# Patient Record
Sex: FEMALE | Race: WHITE
[De-identification: names, ages, dates, MRNs, and addresses within clinical notes are randomized per-mention and may not be internally consistent; named-entity substitution may affect disease eponyms.]

---

## 2018-07-19 ENCOUNTER — HOSPITAL ENCOUNTER (OUTPATIENT)
Dept: HOSPITAL 62 - ER | Age: 58
Setting detail: OBSERVATION
Discharge: ANOTHER HOSPITAL | End: 2018-07-19
Attending: INTERNAL MEDICINE | Admitting: INTERNAL MEDICINE
Payer: MEDICARE

## 2018-07-19 VITALS — DIASTOLIC BLOOD PRESSURE: 77 MMHG | SYSTOLIC BLOOD PRESSURE: 130 MMHG

## 2018-07-19 DIAGNOSIS — Z82.49: ICD-10-CM

## 2018-07-19 DIAGNOSIS — J44.9: ICD-10-CM

## 2018-07-19 DIAGNOSIS — G89.29: ICD-10-CM

## 2018-07-19 DIAGNOSIS — I10: ICD-10-CM

## 2018-07-19 DIAGNOSIS — I21.4: Primary | ICD-10-CM

## 2018-07-19 DIAGNOSIS — F17.200: ICD-10-CM

## 2018-07-19 DIAGNOSIS — Z98.1: ICD-10-CM

## 2018-07-19 DIAGNOSIS — Z79.899: ICD-10-CM

## 2018-07-19 DIAGNOSIS — M54.2: ICD-10-CM

## 2018-07-19 DIAGNOSIS — E03.9: ICD-10-CM

## 2018-07-19 LAB
ABSOLUTE LYMPHOCYTES# (MANUAL): 4.2 10^3/UL (ref 0.5–4.7)
ABSOLUTE MONOCYTES # (MANUAL): 0.6 10^3/UL (ref 0.1–1.4)
ABSOLUTE NEUTROPHILS# (MANUAL): 3.7 10^3/UL (ref 1.7–8.2)
ADD MANUAL DIFF: YES
ALBUMIN SERPL-MCNC: 4.4 G/DL (ref 3.5–5)
ALP SERPL-CCNC: 70 U/L (ref 38–126)
ALT SERPL-CCNC: 27 U/L (ref 9–52)
ANION GAP SERPL CALC-SCNC: 11 MMOL/L (ref 5–19)
AST SERPL-CCNC: 49 U/L (ref 14–36)
BASOPHILS NFR BLD MANUAL: 0 % (ref 0–2)
BILIRUB DIRECT SERPL-MCNC: 0.4 MG/DL (ref 0–0.4)
BILIRUB SERPL-MCNC: 0.4 MG/DL (ref 0.2–1.3)
BUN SERPL-MCNC: 12 MG/DL (ref 7–20)
CALCIUM: 9.9 MG/DL (ref 8.4–10.2)
CHLORIDE SERPL-SCNC: 110 MMOL/L (ref 98–107)
CK MB SERPL-MCNC: 2.98 NG/ML (ref ?–4.55)
CK SERPL-CCNC: 85 U/L (ref 30–135)
CO2 SERPL-SCNC: 26 MMOL/L (ref 22–30)
EOSINOPHIL NFR BLD MANUAL: 11 % (ref 0–6)
ERYTHROCYTE [DISTWIDTH] IN BLOOD BY AUTOMATED COUNT: 12.8 % (ref 11.5–14)
GLUCOSE SERPL-MCNC: 94 MG/DL (ref 75–110)
HCT VFR BLD CALC: 47.5 % (ref 36–47)
HGB BLD-MCNC: 16.4 G/DL (ref 12–15.5)
MCH RBC QN AUTO: 32.7 PG (ref 27–33.4)
MCHC RBC AUTO-ENTMCNC: 34.4 G/DL (ref 32–36)
MCV RBC AUTO: 95 FL (ref 80–97)
MONOCYTES % (MANUAL): 6 % (ref 3–13)
PLATELET # BLD: 174 10^3/UL (ref 150–450)
PLATELET COMMENT: ADEQUATE
POTASSIUM SERPL-SCNC: 4.5 MMOL/L (ref 3.6–5)
PROT SERPL-MCNC: 8 G/DL (ref 6.3–8.2)
RBC # BLD AUTO: 5 10^6/UL (ref 3.72–5.28)
RBC MORPH BLD: (no result)
SEGMENTED NEUTROPHILS % (MAN): 39 % (ref 42–78)
SODIUM SERPL-SCNC: 146.7 MMOL/L (ref 137–145)
TOTAL CELLS COUNTED BLD: 100
TROPONIN I SERPL-MCNC: 1.14 NG/ML
VARIANT LYMPHS NFR BLD MANUAL: 44 % (ref 13–45)
WBC # BLD AUTO: 9.5 10^3/UL (ref 4–10.5)

## 2018-07-19 PROCEDURE — 82553 CREATINE MB FRACTION: CPT

## 2018-07-19 PROCEDURE — 96375 TX/PRO/DX INJ NEW DRUG ADDON: CPT

## 2018-07-19 PROCEDURE — 99285 EMERGENCY DEPT VISIT HI MDM: CPT

## 2018-07-19 PROCEDURE — 36415 COLL VENOUS BLD VENIPUNCTURE: CPT

## 2018-07-19 PROCEDURE — 80053 COMPREHEN METABOLIC PANEL: CPT

## 2018-07-19 PROCEDURE — 96374 THER/PROPH/DIAG INJ IV PUSH: CPT

## 2018-07-19 PROCEDURE — 93010 ELECTROCARDIOGRAM REPORT: CPT

## 2018-07-19 PROCEDURE — 93005 ELECTROCARDIOGRAM TRACING: CPT

## 2018-07-19 PROCEDURE — 93306 TTE W/DOPPLER COMPLETE: CPT

## 2018-07-19 PROCEDURE — G0378 HOSPITAL OBSERVATION PER HR: HCPCS

## 2018-07-19 PROCEDURE — 82550 ASSAY OF CK (CPK): CPT

## 2018-07-19 PROCEDURE — 85025 COMPLETE CBC W/AUTO DIFF WBC: CPT

## 2018-07-19 PROCEDURE — 71045 X-RAY EXAM CHEST 1 VIEW: CPT

## 2018-07-19 PROCEDURE — 85379 FIBRIN DEGRADATION QUANT: CPT

## 2018-07-19 PROCEDURE — 84484 ASSAY OF TROPONIN QUANT: CPT

## 2018-07-19 NOTE — PDOC DISCHARGE SUMMARY
General





- Admit/Disc Date/PCP


Admission Date/Primary Care Provider: 


  07/19/18 10:56





  TERE ROMAN, 





Discharge Date: 07/19/18





- Discharge Diagnosis


(1) NSTEMI (non-ST elevated myocardial infarction)


Is this a current diagnosis for this admission?: Yes   





(2) COPD (chronic obstructive pulmonary disease)


Is this a current diagnosis for this admission?: Yes   





(3) Hypertension


Is this a current diagnosis for this admission?: Yes   





(4) Hypothyroidism


Is this a current diagnosis for this admission?: Yes   





(5) Tobacco dependence


Is this a current diagnosis for this admission?: Yes   





- Additional Information


Resuscitation Status: Full Code


Home Medications: 








Levothyroxine Sodium [Synthroid] 125 mcg PO DAILY 07/19/18 


Meloxicam [Mobic] 15 mg PO DAILY 07/19/18 


Pregabalin [Lyrica] 150 mg PO Q8 07/19/18 


Umeclidinium Brm/Vilanterol Tr [Anoro Ellipta 62.5-25 Mcg INH] 1 each IH DAILY 

07/19/18 











History of Present Illness


History of Present Illness: 


MONICA HENRIQUEZ is a 57 year old  female patient with past medical 

history of hypertension, hypothyroidism chronic neck pain and tobacco 

dependence presents with chief complaint of chest pain.  Patient reports this 

she has had on and off chest pain for the last 1 week but yesterday at about 3 

AM in the morning it woke her up from sound sleep and describes the chest pain 

as pressure-like to the center of her chest nonradiating and about 7 out of 10 

on pain scale.  Patient denies fever, cough, palpitation, diaphoresis, nausea, 

vomiting or change in her bowel habits.  No dizziness, blurry of vision or any 

seizure activity.  Patient has strong family history of coronary artery disease 

and she has been also smoking since age 15.  Her first set of cardiac enzyme is 

1.140 her d-dimer is negative.  Dr. Hewitt is consulted on this patient.


This history obtained by the hospitalist was reviewed and confirmed.  I saw the 

patient at around 12 noon.  At that time patient was completely chest pain-

free.  Her initial EKGs were noted to be normal.  I did offer her cardiac 

catheterization but she declined to pursue that.  She was told that this was 

the preferred treatment and that most likely she would end up needing 

intervention.  Patient also did not want to be transferred to a tertiary care 

at that time.  Subsequently, I was called again at around 3 PM and was informed 

that her troponin I went up.  An EKG was ordered.  On review of EKG at around 7 

PM by me, showed that patient had minor ST segment depression.  Also on 

pressing the patient about whether she had absolutely any chest pain or 

discomfort, she did admit to having tiny amount of chest pains in the chest.  I 

again pressed on her that the preferred approach his heart catheterization and 

treating any lesions as noted by either coronary intervention or bypass surgery 

or just medical management depending on the degree of disease but heart 

catheterization would be the best guide to pursue the best course of option.  

Patient consented for the heart catheterization and wished to be transferred to 

[place] for convenience.  I subsequently got hold of Dr. Stewart who kindly 

accepted the patient.  Patient to be transferred to [facility] for cardiac catheterization tomorrow.  This was 

explained to the patient.  Hospitalist and nurse informed.


In the meantime, patient stable hemodynamically.  Nurses on the fourth floor 

told me that they are comfortable looking after her and will keep a close eye 

on her till transfer.  Patient currently on a monitored bed.


Patient to continue to receive aspirin, Plavix, statin, beta-blocker, Lovenox.





Hospital Course


Hospital Course: 


MONICA HENRIQUEZ is a 57 year old  female patient with past medical 

history of hypertension, hypothyroidism chronic neck pain and tobacco 

dependence presents with chief complaint of chest pain.  Patient reports this 

she has had on and off chest pain for the last 1 week but yesterday at about 3 

AM in the morning it woke her up from sound sleep and describes the chest pain 

as pressure-like to the center of her chest nonradiating and about 7 out of 10 

on pain scale.  Patient denies fever, cough, palpitation, diaphoresis, nausea, 

vomiting or change in her bowel habits.  No dizziness, blurry of vision or any 

seizure activity.  Patient has strong family history of coronary artery disease 

and she has been also smoking since age 15.  Her first set of cardiac enzyme is 

1.140 her d-dimer is negative.  Dr. Hewitt is consulted on this patient.


This history obtained by the hospitalist was reviewed and confirmed.  I saw the 

patient at around 12 noon.  At that time patient was completely chest pain-

free.  Her initial EKGs were noted to be normal.  I did offer her cardiac 

catheterization but she declined to pursue that.  She was told that this was 

the preferred treatment and that most likely she would end up needing 

intervention.  Patient also did not want to be transferred to a tertiary care 

at that time.  Subsequently, I was called again at around 3 PM and was informed 

that her troponin I went up.  An EKG was ordered.  On review of EKG at around 7 

PM by me, showed that patient had minor ST segment depression.  Also on 

pressing the patient about whether she had absolutely any chest pain or 

discomfort, she did admit to having tiny amount of chest pains in the chest.  I 

again pressed on her that the preferred approach his heart catheterization and 

treating any lesions as noted by either coronary intervention or bypass surgery 

or just medical management depending on the degree of disease but heart 

catheterization would be the best guide to pursue the best course of option.  

Patient consented for the heart catheterization and wished to be transferred to 

[place] for convenience.  I subsequently got hold of Dr. Stewart who kindly 

accepted the patient.  Patient to be transferred to [facility] for cardiac catheterization tomorrow.  This was 

explained to the patient.  Hospitalist and nurse informed.


In the meantime, patient stable hemodynamically.  Nurses on the fourth floor 

told me that they are comfortable looking after her and will keep a close eye 

on her till transfer.  Patient currently on a monitored bed.


Patient to continue to receive aspirin, Plavix, statin, beta-blocker, Lovenox.








Physical Exam


Vital Signs: 


 











Temp Pulse Resp BP Pulse Ox


 


 97.7 F   84   17   149/99 H  97 


 


 07/19/18 13:22  07/19/18 14:00  07/19/18 13:22  07/19/18 13:22  07/19/18 13:22








 Intake & Output











 07/18/18 07/19/18 07/20/18





 06:59 06:59 06:59


 


Weight   63.3 kg











General appearance: PRESENT: no acute distress, well-developed, well-nourished


Exam: 





Please see cardiology consultation note.  No significant change from admission.


Head exam: PRESENT: atraumatic, normocephalic


Eye exam: PRESENT: conjunctiva pink, EOMI, PERRLA.  ABSENT: scleral icterus


Ear exam: PRESENT: normal external ear exam


Mouth exam: PRESENT: moist, tongue midline


Neck exam: ABSENT: carotid bruit, JVD, lymphadenopathy, thyromegaly


Respiratory exam: PRESENT: clear to auscultation faheem.  ABSENT: rales, rhonchi, 

wheezes


Cardiovascular exam: PRESENT: RRR.  ABSENT: diastolic murmur, rubs, systolic 

murmur


Pulses: PRESENT: normal dorsalis pedis pul


Vascular exam: PRESENT: normal capillary refill


GI/Abdominal exam: PRESENT: normal bowel sounds, soft.  ABSENT: distended, 

guarding, mass, organolmegaly, rebound, tenderness


Rectal exam: PRESENT: deferred


Extremities exam: PRESENT: full ROM.  ABSENT: calf tenderness, clubbing, pedal 

edema


Neurological exam: PRESENT: alert, awake, oriented to person, oriented to place

, oriented to time, oriented to situation, CN II-XII grossly intact.  ABSENT: 

motor sensory deficit


Psychiatric exam: PRESENT: appropriate affect, normal mood.  ABSENT: homicidal 

ideation, suicidal ideation


Skin exam: PRESENT: dry, intact, warm.  ABSENT: cyanosis, rash





Results


Laboratory Results: 


 











  07/19/18





  15:00


 


Troponin I  1.160











Impressions: 


 





Chest X-Ray  07/19/18 05:38


IMPRESSION:  NO ACUTE RADIOGRAPHIC FINDING IN THE CHEST.


 














Qualifiers





- *


PATIENT BEING DISCHARGED WITH ANY OF THE FOLLOWING DIAGNOSIS: No, MI


VTE patient discharged on overlapping Therapy?: Yes


Reason(s) for not prescribing Overlap Therapy:: Not indicated


Reason(s) for not prescribing Anti-thrombolytic therapy:: Not indicated


Stroke Pt being discharged on Statins?: Yes


MI Pt being discharged on Aspirin therapy?: Yes


MI Pt being discharged on Statins?: Yes


MI Pt discharged ACEI/ARBS?: No


Reason(s) for not prescribing ACEI/ARBS:: Compl of medication care


HF Pt being discharged on ACEI for LVEF less than 40%?: No


Reason(s) for not prescribing ACEI:: Not indicated


HF Pt being discharged on ARBS for LVEF less than 40%?: No


Reason(s) for not prescribing ARBS:: Not indicated


HF Pt with Afib discharged with Warfarin?: No


Reason(s) for not prescribing Warfarin:: Not indicated


HF Pt discharged on evidence-based Beta Blocker:: Yes





Plan


Discharge Plan: 





Transferred to [facility] for heart cath and coronary intervention if needed.


Time Spent: Greater than 30 Minutes - Transferred to [facility].  Risk benefits 

of cardiac cath, management plans discussed with the patient in detail.

## 2018-07-19 NOTE — EKG REPORT
SEVERITY:- BORDERLINE ECG -

SINUS RHYTHM

BORDERLINE ST DEPRESSION, ANTEROLATERAL LEADS

:

Confirmed by: Taylor Hewitt 19-Jul-2018 22:51:23

## 2018-07-19 NOTE — ER DOCUMENT REPORT
ED Cardiac





- General


TRAVEL OUTSIDE OF THE [place] IN LAST 30 DAYS: No





- HPI


Patient complains to provider of: Chest pain


Was the onset of pain: Sudden


Chest pain location: Substernal


Quality of pain: None


Chest pain radiation location: Neck


Cardiac risk factors: Smoker, + Family history





<LENNY BLANDON - Last Filed: 07/19/18 06:28>





<REGI BURCH - Last Filed: 07/19/18 10:52>





- General


Chief Complaint: Chest Pain


Time Seen by Provider: 07/19/18 05:37


Notes: 





Patient is a 57-year-old female with a past history of hypothyroidism, COPD, 

smoking, and chronic neck pain who called EMS for chest pain that woke her from 

sleep.  Upon arrival, EMS noted that her chest pain was sounded to 10.  She 

received 324 mg of aspirin as well as 2 sublingual nitroglycerin which 

completely resolved her pain.  She has had no further chest pain here in the 

emergency department.  She denies any trouble breathing or nausea.  States that 

the pain radiated to her neck but that has also resolved.  Denies any history 

of coronary artery disease or congestive heart failure.  Her family has had a 

strong history of coronary artery disease.


Patient has never been seen for chest pain by physician.  She apparently had 

similar symptoms 2 weeks ago, called EMS, and then did not want to be 

transported to the hospital. (LENNY BLANDON)





- Related Data


Allergies/Adverse Reactions: 


 





Sulfa (Sulfonamide Antibiotics) Allergy (Verified 07/19/18 05:45)


 


shellfish derived Adverse Reaction (Verified 07/19/18 05:45)


 











Past Medical History





- Social History


Smoking Status: Current Every Day Smoker


Chew tobacco use (# tins/day): No


Frequency of alcohol use: Occasional


Drug Abuse: None


Lives with: Alone


Family History: CAD, DM


Patient has suicidal ideation: No


Patient has homicidal ideation: No


Pulmonary Medical History: Reports: Hx COPD


Endocrine Medical History: Reports: Hx Hypothyroidism


Renal/ Medical History: Denies: Hx Peritoneal Dialysis


Musculoskeletal Medical History: Reports None


Past Surgical History: Reports: Other - Cervical fusion





<LENNY BLANDON - Last Filed: 07/19/18 06:28>





Review of Systems





- Review of Systems


Constitutional: No symptoms reported


EENT: No symptoms reported


Cardiovascular: See HPI


Respiratory: No symptoms reported


Gastrointestinal: No symptoms reported


Genitourinary: No symptoms reported


Female Genitourinary: No symptoms reported


Musculoskeletal: No symptoms reported


Skin: No symptoms reported


Hematologic/Lymphatic: No symptoms reported


Neurological/Psychological: No symptoms reported





<LENNY BLANDON - Last Filed: 07/19/18 06:28>





Physical Exam





- Vital signs


Interpretation: Normal





- General


General appearance: Appears well, Alert





- HEENT


Head: Normocephalic, Atraumatic


Eyes: Normal


Pupils: PERRL





- Respiratory


Respiratory status: No respiratory distress


Chest status: Nontender


Breath sounds: Normal


Chest palpation: Normal





- Cardiovascular


Rhythm: Regular


Heart sounds: Normal auscultation


Murmur: No





- Abdominal


Inspection: Normal


Distension: No distension


Bowel sounds: Normal


Tenderness: Nontender


Organomegaly: No organomegaly





- Back


Back: Normal, Nontender





- Extremities


General upper extremity: Normal inspection, Nontender, Normal color, Normal ROM

, Normal temperature


General lower extremity: Normal inspection, Nontender, Normal color, Normal ROM

, Normal temperature, Normal weight bearing.  No: Chadwick's sign





- Neurological


Neuro grossly intact: Yes


Cognition: Normal


Orientation: AAOx4


Chicken Coma Scale Eye Opening: Spontaneous


Tone Coma Scale Verbal: Oriented


Tone Coma Scale Motor: Obeys Commands


Tone Coma Scale Total: 15


Speech: Normal


Motor strength normal: LUE, RUE, LLE, RLE


Sensory: Normal





- Psychological


Associated symptoms: Normal affect, Normal mood





- Skin


Skin Temperature: Warm


Skin Moisture: Dry


Skin Color: Normal





<LENNY BLANDON - Last Filed: 07/19/18 06:28>





- Vital signs


Vitals: 


 











Pulse Ox


 


 91 L


 


 07/19/18 05:44














Course





- Laboratory


Result Diagrams: 


 07/19/18 04:32





 07/19/18 04:32





- Diagnostic Test


Radiology reviewed: Reports reviewed





- EKG Interpretation by Me


EKG shows normal: Sinus rhythm


Rate: Normal


Rhythm: NSR





<LENNY BLANDON - Last Filed: 07/19/18 06:28>





- Laboratory


Result Diagrams: 


 07/19/18 04:32





 07/19/18 04:32





- Consults


  ** Dr. Zuniga


Time consulted: 09:05


Consulted provider: will come to ER





<REGI BURCH - Last Filed: 07/19/18 10:52>





- Re-evaluation


Re-evalutation: 





07/19/18 06:31


Patient is a 57-year-old female who comes in complaining of chest pain prior to 

arrival.  She was given aspirin and 2 nitroglycerin prior to arrival which 

completely resolved her chest pain.  She has had no further chest pain here in 

the emergency department in no acute changes on EKG.  Troponin is 1.1.  Patient 

was discussed with Dr. Hewitt to see if this patient with a likely and STEMI 

could stay here for further evaluation.  Recommends doing repeat EKG and repeat 

troponin and then call to discuss.  Patient care will be transitioned to Dr. Burch at 0630am. (LENNY BLANDON)








07/19/18 07:56


The first troponin was 1.14, 2 hours later is 1.04


Dr. Hewitt wants to exclude PE, but feels the patient can be admitted here.


I will do a d-dimer before going to CTA chest. (REGI BURCH)





- Vital Signs


Vital signs: 


 











Temp Pulse Resp BP Pulse Ox


 


       17   142/99 H  95 


 


       07/19/18 10:03  07/19/18 10:03  07/19/18 10:03














- Laboratory


Laboratory results interpreted by me: 


 











  07/19/18 07/19/18





  04:32 04:32


 


Hgb  16.4 H 


 


Hct  47.5 H 


 


Seg Neuts % (Manual)  39 L 


 


Eosinophils % (Manual)  11 H 


 


Absolute Eos (Manual)  1.0 H 


 


Sodium   146.7 H


 


Chloride   110 H


 


AST   49 H














Discharge





<LENNY BLANDON - Last Filed: 07/19/18 06:28>





- Discharge


Admitting Provider: Hospitalist


Unit Admitted: Telemetry





<REGI BURCH - Last Filed: 07/19/18 10:52>





- Discharge


Clinical Impression: 


 NSTEMI (non-ST elevated myocardial infarction)





Condition: Stable


Disposition: ADMITTED AS OBSERVATION


Referrals: 


TERE ROMAN,  [Primary Care Provider] - Follow up as needed

## 2018-07-19 NOTE — XCELERA REPORT
[address] [address]

                             Tel: [phone]

                             Fax: [phone]



                    Transthoracic Echocardiogram Report

____________________________________________________________________________



Name: MONICA HENRIQUEZ

MRN: [id number]                Age: 57 yrs

Gender: Female                 : 1960

Patient Status: Inpatient      Patient Location: [address] [place] [address]

Account #: [id number]

Study Date: 2018 03:19 PM

Accession #: [id number]

____________________________________________________________________________





____________________________________________________________________________



Procedure: A complete two-dimensional transthoracic echocardiogram was

performed (2D, M-mode, spectral and color flow Doppler). The study was

technically adequate with some images being suboptimal in quality.

Reason For Study: NSTEMI





Ordering Physician: KALANI ROSADO

Performed By: Nelda Woo

____________________________________________________________________________





Interpretation Summary

The left ventricular ejection fraction is normal.

There is borderline concentric left ventricular hypertrophy.

The left ventricle is grossly normal size.

Doppler measurements suggest pseudonormalized left ventricular relaxation,

which is associated with grade II/IV or mild to moderate diastolic

dysfunction

Wall motion cannot be accurately commented on, but no definite regional

wall motion abnormalities noted.

Borderline right ventricular enlargement.

The right ventricular systolic function is normal.

The right atrium is normal.

The left atrial size is normal.

There is no mitral valve stenosis.

There is no mitral regurgitation noted.

There is no aortic valve stenosis

No aortic regurgitation is present.

There is no tricuspid stenosis.

No tricuspid regurgitation.

The aortic root is not well visualized but is probably normal size.

The inferior vena cava was not well visualized

There is no pericardial effusion.

____________________________________________________________________________





MMode/2D Measurements & Calculations

RVDd: 2.9 cm       LVIDd: 4.8 cm FS: 37.8 %          Ao root diam: 2.4 cm

IVSd: 0.83 cm      LVIDs: 3.0 cm EDV(Teich): 108.9 mlAo root area: 4.6 cm2

                   LVPWd: 0.78 cmESV(Teich): 35.1 ml

                                 EF(Teich): 67.8 %

        _____________________________________________________________



LVOT diam: 1.4 cm

LVOT area: 1.6 cm2



Doppler Measurements & Calculations

MV E max kurt:      MV dec slope:        Ao V2 max:        LV V1 max P.0 cm/sec                             108.0 cm/sec      1.8 mmHg

MV A max kurt:      309.3 cm/sec2        Ao max PG:        LV V1 max:

64.9 cm/sec        MV dec time:         4.7 mmHg          67.0 cm/sec

MV E/A: 0.91       0.19 sec

                                        ANAIS(V,D): 1.0 cm2

        _____________________________________________________________

PA V2 max:

80.0 cm/sec

PA max P.6 mmHg



____________________________________________________________________________

Left Ventricle

The left ventricle is grossly normal size. There is borderline concentric

left ventricular hypertrophy. The left ventricular ejection fraction is

normal. Doppler measurements suggest pseudonormalized left ventricular

relaxation, which is associated with grade II/IV or mild to moderate

diastolic dysfunction. Wall motion cannot be accurately commented on, but

no definite regional wall motion abnormalities noted.





Right Ventricle

Borderline right ventricular enlargement. There is normal right ventricular

wall thickness. The right ventricular systolic function is normal.



Atria

The right atrium is normal. The left atrial size is normal. Interarterial

septum not well visualized and not well dopplered. Cannot comment on

ASD/PFO presence.



Mitral Valve

The mitral valve is grossly normal. There is no mitral valve stenosis.

There is no mitral regurgitation noted.



Aortic Valve

The aortic valve is not well visualized secondary to technical limitations.

There is no aortic valve stenosis. No aortic regurgitation is present.



Tricuspid Valve

The tricuspid valve is not well visualized, but is grossly normal. There is

no tricuspid stenosis. No tricuspid regurgitation.



Pulmonic Valve

The pulmonic valve is not well visualized.





Great Vessels

The aortic root is not well visualized but is probably normal size. The

inferior vena cava was not well visualized.



Effusions

There is no pericardial effusion.



____________________________________________________________________________



Electronically signed by:      Taylor Hewitt      on 2018 08:19 PM



CC: KALANI ROSADO

>

Taylor Hewitt

## 2018-07-19 NOTE — EKG REPORT
SEVERITY:- ABNORMAL ECG -

SINUS RHYTHM

BORDERLINE RIGHT AXIS DEVIATION

:

Confirmed by: Taylor Hewitt 19-Jul-2018 22:52:16

## 2018-07-19 NOTE — RADIOLOGY REPORT (SQ)
EXAM DESCRIPTION:  CHEST SINGLE VIEW



COMPLETED DATE/TIME:  7/19/2018 5:55 am



REASON FOR STUDY:  Chest pain



COMPARISON:  None.



EXAM PARAMETERS:  NUMBER OF VIEWS: One view.

TECHNIQUE: Single frontal radiographic view of the chest acquired.

RADIATION DOSE: NA

LIMITATIONS: None.



FINDINGS:  LUNGS AND PLEURA: No opacities, masses or pneumothorax. No pleural effusion.

MEDIASTINUM AND HILAR STRUCTURES: No masses.  Contour normal.

HEART AND VASCULAR STRUCTURES: Heart normal in size.  Normal vasculature.

BONES: No acute changes.  Lower cervical fusion hardware

HARDWARE: None in the chest.

OTHER: No other significant finding.



IMPRESSION:  NO ACUTE RADIOGRAPHIC FINDING IN THE CHEST.



TECHNICAL DOCUMENTATION:  JOB ID:  [id number]

 2011 [email or web address]- All Rights Reserved



Reading location - IP/workstation name: [email or web address]

## 2018-07-19 NOTE — PDOC H&P
History of Present Illness


Admission Date/PCP: 


  





  TERE ROMAN DO





History of Present Illness: 


MONICA HENRIQUEZ is a 57 year old  female patient with past medical 

history of hypertension, hypothyroidism chronic neck pain and tobacco 

dependence presents with chief complaint of chest pain.  Patient reports this 

she has had on and off chest pain for the last 1 week but yesterday at about 3 

AM in the morning it woke her up from sound sleep and describes the chest pain 

as pressure-like to the center of her chest nonradiating and about 7 out of 10 

on pain scale.  Patient denies fever, cough, palpitation, diaphoresis, nausea, 

vomiting or change in her bowel habits.  No dizziness, blurry of vision or any 

seizure activity.  Patient has strong family history of coronary artery disease 

and she has been also smoking since age 15.  Her first set of cardiac enzyme is 

1.140 her d-dimer is negative.  Dr. Hewitt is consulted on this patient








Past Medical History


Pulmonary Medical History: Reports: Chronic Obstructive Pulmonary Disease (COPD)


Endocrine Medical History: Reports: Hypothyroidism


Musculoskeltal Medical History: Reports: None





Past Surgical History


Past Surgical History: Reports: Other - Cervical fusion





Social History


Lives with: Alone


Smoking Status: Current Every Day Smoker


Frequency of Alcohol Use: None


Hx Recreational Drug Use: No


Drugs: None





- Advance Directive


Resuscitation Status: Full Code





Family History


Family History: CAD, DM


Parental Family History Reviewed: Yes


Children Family History Reviewed: Yes


Sibling(s) Family History Reviewed.: Yes





Medication/Allergy


Allergies/Adverse Reactions: 


 





Sulfa (Sulfonamide Antibiotics) Allergy (Verified 07/19/18 05:45)


 


shellfish derived Adverse Reaction (Verified 07/19/18 05:45)


 











Review of Systems


Constitutional: PRESENT: as per HPI


Ears: PRESENT: as per HPI


Cardiovascular: PRESENT: as per HPI


Gastrointestinal: PRESENT: as per HPI


Musculoskeletal: PRESENT: as per HPI


Neurological: PRESENT: as per HPI





Physical Exam


Vital Signs: 


 











Temp Pulse Resp BP Pulse Ox


 


       16   106/76   94 


 


       07/19/18 07:01  07/19/18 07:01  07/19/18 07:01








 Intake & Output











 07/18/18 07/19/18 07/20/18





 06:59 06:59 06:59


 


Weight   63.8 kg











General appearance: PRESENT: no acute distress, well-developed, well-nourished


Head exam: PRESENT: atraumatic, normocephalic


Eye exam: PRESENT: conjunctiva pink, EOMI, PERRLA.  ABSENT: scleral icterus


Ear exam: PRESENT: normal external ear exam


Mouth exam: PRESENT: moist, tongue midline


Neck exam: ABSENT: carotid bruit, JVD, lymphadenopathy, thyromegaly


Respiratory exam: PRESENT: clear to auscultation faheem.  ABSENT: rales, rhonchi, 

wheezes


Cardiovascular exam: PRESENT: RRR.  ABSENT: diastolic murmur, rubs, systolic 

murmur


Pulses: PRESENT: normal dorsalis pedis pul


Vascular exam: PRESENT: normal capillary refill


GI/Abdominal exam: PRESENT: normal bowel sounds, soft.  ABSENT: distended, 

guarding, mass, organolmegaly, rebound, tenderness


Rectal exam: PRESENT: deferred


Extremities exam: PRESENT: full ROM.  ABSENT: calf tenderness, clubbing, pedal 

edema


Neurological exam: PRESENT: alert, awake, oriented to person, oriented to place

, oriented to time, oriented to situation.  ABSENT: motor sensory deficit


Psychiatric exam: PRESENT: appropriate affect, normal mood.  ABSENT: homicidal 

ideation, suicidal ideation


Skin exam: PRESENT: dry, intact, warm.  ABSENT: cyanosis, rash





Results


Laboratory Results: 


 





 07/19/18 04:32 





 07/19/18 04:32 





 











  07/19/18 07/19/18





  04:32 04:32


 


WBC  9.5 


 


RBC  5.00 


 


Hgb  16.4 H 


 


Hct  47.5 H 


 


MCV  95 


 


MCH  32.7 


 


MCHC  34.4 


 


RDW  12.8 


 


Plt Count  174 


 


Seg Neutrophils %  Not Reportable 


 


Lymphocytes %  Not Reportable 


 


Monocytes %  Not Reportable 


 


Eosinophils %  Not Reportable 


 


Basophils %  Not Reportable 


 


Absolute Neutrophils  Not Reportable 


 


Absolute Lymphocytes  Not Reportable 


 


Absolute Monocytes  Not Reportable 


 


Absolute Eosinophils  Not Reportable 


 


Absolute Basophils  Not Reportable 


 


Sodium   146.7 H


 


Potassium   4.5


 


Chloride   110 H


 


Carbon Dioxide   26


 


Anion Gap   11


 


BUN   12


 


Creatinine   0.75


 


Est GFR ( Amer)   > 60


 


Est GFR (Non-Af Amer)   > 60


 


Glucose   94


 


Calcium   9.9


 


Total Bilirubin   0.4


 


AST   49 H


 


ALT   27


 


Alkaline Phosphatase   70


 


Total Protein   8.0


 


Albumin   4.4








 











  07/19/18 07/19/18 07/19/18





  04:32 04:32 06:30


 


Creatine Kinase  85  


 


CK-MB (CK-2)   2.98 


 


Troponin I   1.140  1.040











Impressions: 


 





Chest X-Ray  07/19/18 05:38


IMPRESSION:  NO ACUTE RADIOGRAPHIC FINDING IN THE CHEST.


 














Assessment & Plan





- Diagnosis


(1) NSTEMI (non-ST elevated myocardial infarction)


Is this a current diagnosis for this admission?: Yes   


Plan: 


Patient scheduled for stress test.








(2) COPD (chronic obstructive pulmonary disease)


Qualifiers: 


   Emphysema type: unspecified 


Is this a current diagnosis for this admission?: Yes   


Plan: 


Without exacerbation.


We will put her on as needed bronchodilators.








(3) Hypertension


Qualifiers: 


   Hypertension type: essential hypertension   Qualified Code(s): I10 - 

Essential (primary) hypertension   


Is this a current diagnosis for this admission?: Yes   


Plan: 


Patient is not on any medication.








(4) Hypothyroidism


Qualifiers: 


   Hypothyroidism type: acquired   Qualified Code(s): E03.9 - Hypothyroidism, 

unspecified   


Is this a current diagnosis for this admission?: Yes   


Plan: 


We will continue her home Synthroid.








(5) Tobacco dependence


Is this a current diagnosis for this admission?: Yes   


Plan: 


Patient counseled and encouraged to quit smoking

## 2018-07-19 NOTE — PDOC CONSULTATION
Consultation


Consult Date: 07/19/18


Attending physician:: KALANI ROSADO


Consult reason:: Chest pain and positive troponin I





History of Present Illness


Admission Date/PCP: 


  07/19/18 10:56





  TERE ROMAN DO





Patient complains of: Chest pain


History of Present Illness: 


MONICA HENRIQUEZ is a 57 year old  female patient with past medical 

history of hypertension, hypothyroidism chronic neck pain and tobacco 

dependence presents with chief complaint of chest pain.  Patient reports this 

she has had on and off chest pain for the last 1 week but yesterday at about 3 

AM in the morning it woke her up from sound sleep and describes the chest pain 

as pressure-like to the center of her chest nonradiating and about 7 out of 10 

on pain scale.  Patient denies fever, cough, palpitation, diaphoresis, nausea, 

vomiting or change in her bowel habits.  No dizziness, blurry of vision or any 

seizure activity.  Patient has strong family history of coronary artery disease 

and she has been also smoking since age 15.  Her first set of cardiac enzyme is 

1.140 her d-dimer is negative.  Dr. Hewitt is consulted on this patient.


This history obtained by the hospitalist was reviewed and confirmed.  I saw the 

patient at around 12 noon.  At that time patient was completely chest pain-

free.  Her initial EKGs were noted to be normal.  I did offer her cardiac 

catheterization but she declined to pursue that.  She was told that this was 

the preferred treatment and that most likely she would end up needing 

intervention.  Patient also did not want to be transferred to a tertiary care 

at that time.  Subsequently, I was called again at around 3 PM and was informed 

that her troponin I went up.  An EKG was ordered.  On review of EKG at around 7 

PM by me, showed that patient had minor ST segment depression.  Also on 

pressing the patient about whether she had absolutely any chest pain or 

discomfort, she did admit to having tiny amount of chest pains in the chest.  I 

again pressed on her that the preferred approach his heart catheterization and 

treating any lesions as noted by either coronary intervention or bypass surgery 

or just medical management depending on the degree of disease but heart 

catheterization would be the best guide to pursue the best course of option.  

Patient consented for the heart catheterization and wished to be transferred to 

[place] for convenience.  I subsequently got hold of Dr. Stewart who kindly 

accepted the patient.  Patient to be transferred to [facility] for cardiac catheterization tomorrow.  This was 

explained to the patient.  Hospitalist and nurse informed.


In the meantime, patient stable hemodynamically.  Nurses on the fourth floor 

told me that they are comfortable looking after her and will keep a close eye 

on her till transfer.  Patient currently on a monitored bed.


Patient to continue to receive aspirin, Plavix, statin, beta-blocker, Lovenox.





Past Medical History


Cardiac Medical History: Reports: Hypertension


Pulmonary Medical History: Reports: Bronchitis, Chronic Obstructive Pulmonary 

Disease (COPD)


   Denies: Asthma


Endocrine Medical History: Reports: Hypothyroidism


GI Medical History: 


   Denies: Gastroesophageal Reflux Disease


Musculoskeltal Medical History: Reports: None, Arthritis


Psychiatric Medical History: Reports: Depression





Past Surgical History


Past Surgical History: Reports: Other - Cervical fusion





Social History


Information Source: Patient


Lives with: Alone


Smoking Status: Current Every Day Smoker


Frequency of Alcohol Use: None


Hx Recreational Drug Use: No


Drugs: None





- Advance Directive


Resuscitation Status: Full Code


Surrogate healthcare decision maker:: 





Patient sister is the surrogate decision-maker





Family History


Family History: CAD, DM


Parental Family History Reviewed: Yes


Children Family History Reviewed: Yes


Sibling(s) Family History Reviewed.: Yes





Medication/Allergy


Home Medications: 








Levothyroxine Sodium [Synthroid] 125 mcg PO DAILY 07/19/18 


Meloxicam [Mobic] 15 mg PO DAILY 07/19/18 


Pregabalin [Lyrica] 150 mg PO Q8 07/19/18 


Umeclidinium Brm/Vilanterol Tr [Anoro Ellipta 62.5-25 Mcg INH] 1 each IH DAILY 

07/19/18 








Allergies/Adverse Reactions: 


 





Sulfa (Sulfonamide Antibiotics) Allergy (Verified 07/19/18 05:45)


 


shellfish derived Adverse Reaction (Verified 07/19/18 05:45)


 











Review of Systems


Review of Systems: 





Please see history of present illness and past medical history as wall.


Constitutional: No fever or chills reported.


Head : No recent chronic headaches, recent head injury.


Eyes: No recent eye pain, diplopia, redness, discharge, acute visual changes.


Ears: No recent chronic ear pain, acute hearing loss, ear discharge.


Oral cavity: No recent  ulcerations, bleeding, oral cavity discomfort.


Neck: No recent acute neck pain reported.


Hematologic: No recent easy bruising or bleeding.


Lymphatic: No recent lymph node enlargement reported.


Cardiovascular system review: See history of present illness.


Respiratory system review: No hemoptysis or blood clots in the lungs reported. 

Mild Shortness of breath on exertion


Gastrointestinal system review: Negative for any recent acute hematemesis, 

melena.  


Genitourinary system review: No recent acute or chronic hematuria, flank pain, 

UTI etc. reported.


Skin system review: Negative for any recent abnormal bruising, no rash, no 

pruritus reported.


Neurologic: No prior history of strokes, mini strokes, seizure disorder.


Psychologic: No history of major psychosis or major depression reported.


Musculoskeletal: Minor aches and pains reported.  No acute joint swelling 

reported.  Describes history of chronic neck pain


Endocrine: No recent polyuria, polydipsia, recent heat or cold intolerance.





Physical Exam


Vital Signs: 


 











Temp Pulse Resp BP Pulse Ox


 


 97.7 F   84   17   149/99 H  97 


 


 07/19/18 13:22  07/19/18 14:00  07/19/18 13:22  07/19/18 13:22  07/19/18 13:22








 Intake & Output











 07/18/18 07/19/18 07/20/18





 06:59 06:59 06:59


 


Weight   63.3 kg











Exam: 








GENERAL: well-nourished and in no acute distress.  Alert and oriented x3


HEAD: Atraumatic, normocephalic.


EYES: Pupils equal round and reactive to light, extraocular movements intact, 

sclera anicteric, conjunctiva are normal.


ENT: TMs normal, nares patent, oropharynx clear without exudates. Moist mucous 

membranes.  No oral ulcerations or bleeding gums noted


NECK: supple without lymphadenopathy.  Trachea is central.  No cervical or 

axillary lymphadenopathy noted.  Carotids are 2+, JVD WNL 


LUNGS: Respiration seems nonlabored, no significant accessory muscle action 

noted.  Breath sounds clear to auscultation bilaterally and equal noted. No 

wheezes rales or rhonchi noted.  No significant dullness noted on percussion.


CHEST: Palpation of the chest wall shows no significant chest wall tenderness. 


HEART: [place] NP, No PSH,  1/6 BRETT aortic area, 1/6 pan systolic murmur mitral 

area, no rubs, no gallops.


ABDOMEN: Soft, no significant tenderness appreciated, normoactive bowel sounds. 

No guarding, no rebound.  No rigidity noted . No masses appreciated.


EXTREMITIES: Pedal pulses are 1-2+, no calf tenderness noted. No clubbing or 

cyanosis. negative pedal edema noted


NEUROLOGICAL: Focused neurological exam showed no significant neurologic 

deficit. Normal speech, no focal weakness appreciated. 


PSYCH: Normal mood, normal affect.  Judgment and insight within normal limits.


SKIN: No significant ecchymosis, skin is noted to be warm.


MUSCULOSKELETAL EXAM: No significant acute joint swelling noted.





Results


Laboratory Results: 


 











  07/19/18





  15:00


 


Troponin I  1.160











EKG Comments: 





Initial EKG shows sinus rhythm without any acute ST-T wave changes.  Subsequent 

EKG shows minor ST segment depression in precordial leads.


Impressions: 


 





Chest X-Ray  07/19/18 05:38


IMPRESSION:  NO ACUTE RADIOGRAPHIC FINDING IN THE CHEST.


 














Assessment & Plan





- Diagnosis


(1) NSTEMI (non-ST elevated myocardial infarction)


Is this a current diagnosis for this admission?: Yes   





(2) COPD (chronic obstructive pulmonary disease)


Qualifiers: 


   Emphysema type: unspecified 


Is this a current diagnosis for this admission?: Yes   





(3) Hypertension


Qualifiers: 


   Hypertension type: essential hypertension   Qualified Code(s): I10 - 

Essential (primary) hypertension   


Is this a current diagnosis for this admission?: Yes   





(4) Hypothyroidism


Qualifiers: 


   Hypothyroidism type: acquired   Qualified Code(s): E03.9 - Hypothyroidism, 

unspecified   


Is this a current diagnosis for this admission?: Yes   





(5) Tobacco dependence


Is this a current diagnosis for this admission?: Yes   





- Notes


Notes: 


Non-STEMI: Please see discussion under HPI.  Patient being adequately treated.  

Will consider starting patient on Aggrastat if needed.  Patient to be closely 

monitored for any deterioration until transferred to [place].  Patient 

explained risk benefits of transfer.


COPD: Currently stable.


Tobacco abuse: Patient has been advised not to smoke.


Hypertension: Currently under satisfactory control.


Hypothyroidism: Continue replacement therapy.








- Time


Time Spent: 50 to 70 Minutes - CODE STATUS was discussed, patient remains full 

code.  Surrogate decision-maker unchanged.  Multiple medical problems were 

addressed.  More than 50% of the time spent coordinating care, discussing 

management plans with involved caregivers.  Management plans discussed with 

involved personnels.  Medical decision making was of moderate to high complexity

, patient's has multiple  comorbidities.


Medications reviewed and adjusted accordingly: Yes